# Patient Record
Sex: FEMALE | Race: WHITE | NOT HISPANIC OR LATINO | URBAN - NONMETROPOLITAN AREA
[De-identification: names, ages, dates, MRNs, and addresses within clinical notes are randomized per-mention and may not be internally consistent; named-entity substitution may affect disease eponyms.]

---

## 2019-07-08 ENCOUNTER — IMPORTED ENCOUNTER (OUTPATIENT)
Dept: URBAN - NONMETROPOLITAN AREA CLINIC 1 | Facility: CLINIC | Age: 69
End: 2019-07-08

## 2019-07-08 PROBLEM — D31.31: Noted: 2019-07-08

## 2019-07-08 PROBLEM — E11.9: Noted: 2019-07-08

## 2019-07-08 PROBLEM — H52.4: Noted: 2019-07-08

## 2019-07-08 PROBLEM — H10.32: Noted: 2019-07-08

## 2019-07-08 PROBLEM — D31.32: Noted: 2019-07-08

## 2019-07-08 PROBLEM — H25.13: Noted: 2019-07-08

## 2019-07-08 PROCEDURE — 99213 OFFICE O/P EST LOW 20 MIN: CPT

## 2019-07-08 NOTE — PATIENT DISCUSSION
Acute Conjunctivitis OSDiscussed findings in detail with patient. (-) foreign body (-) abrasion. Start Zylet TID OS sample given today use until sample is empty. Continue to monitor. RTC N/A for complete eye exam Presbyopia OUDiscussed refractive status in detail with patientContinue to monitor. NIDDM sans Retinopathy Discussed diagnosis with patientDiscussed the risk of diabetic damage of the retina with potential vision loss and the importance of routine follow-upEmphasized strict blood sugar controlContinue to Todd OUDiscussed diagnosis with patientReviewed symptoms related to cataract progressionDiscussed various treatment options with patientPatient defers treatment at this timeContinue to monitorChor Nevus OUDiscussed diagnosis in detail with patientContinue to monitor; Dr's Notes: PCP - Anika ADAM at [facility] 6/22/18DFE  6/16/17OCTOptos  12/12/16

## 2019-07-15 ENCOUNTER — IMPORTED ENCOUNTER (OUTPATIENT)
Dept: URBAN - NONMETROPOLITAN AREA CLINIC 1 | Facility: CLINIC | Age: 69
End: 2019-07-15

## 2019-07-15 PROBLEM — H52.4: Noted: 2019-07-15

## 2019-07-15 PROBLEM — E11.9: Noted: 2019-10-02

## 2019-07-15 PROBLEM — D31.32: Noted: 2019-07-15

## 2019-07-15 PROBLEM — D31.31: Noted: 2019-07-15

## 2019-07-15 PROBLEM — H52.4: Noted: 2019-10-02

## 2019-07-15 PROBLEM — H25.13: Noted: 2019-10-02

## 2019-07-15 PROBLEM — E11.9: Noted: 2019-07-15

## 2019-07-15 PROBLEM — H25.13: Noted: 2019-07-15

## 2019-07-15 PROCEDURE — 92014 COMPRE OPH EXAM EST PT 1/>: CPT

## 2019-07-15 PROCEDURE — 92015 DETERMINE REFRACTIVE STATE: CPT

## 2019-07-15 NOTE — PATIENT DISCUSSION
Presbyopia OUDiscussed refractive status in detail with patientMR done today but do not recommend updating due to cataract progression. Continue to monitor. NIDDM sans Retinopathy Discussed diagnosis with patientDiscussed the risk of diabetic damage of the retina with potential vision loss and the importance of routine follow-upEmphasized strict blood sugar controlContinue to [place] OUDiscussed diagnosis with patientReviewed symptoms related to cataract progressionDiscussed various treatment options with patientRecommend refer to Dr. Stephen Gillette for evaluation. Patient agrees with plan will scheduleChor Nevus OUDiscussed diagnosis in detail with patientContinue to monitor; Dr's Notes: PCP - Kanika ADAM at [facility] 7/15/19DFE  7/15/19Optos  12/12/16

## 2019-10-02 ENCOUNTER — IMPORTED ENCOUNTER (OUTPATIENT)
Dept: URBAN - NONMETROPOLITAN AREA CLINIC 1 | Facility: CLINIC | Age: 69
End: 2019-10-02

## 2019-10-02 PROCEDURE — 99214 OFFICE O/P EST MOD 30 MIN: CPT

## 2019-10-02 NOTE — PATIENT DISCUSSION
NIDDM sans Retinopathy Discussed diagnosis with patientDiscussed the risk of diabetic damage of the retina with potential vision loss and the importance of routine follow-upEmphasized strict blood sugar controlContinue to monitorChor Nevus OUDiscussed diagnosis in detail with patientContinue to monitorCataract(s)-Visually significant cataract OU .-Cataract(s) causing symptomatic impairment of visual function not correctable with a tolerable change in glasses or contact lenses lighting or non-operative means resulting in specific activity limitations and/or participation restrictions including but not limited to reading viewing television driving or meeting vocational or recreational needs. -Expectation is clearer vision and functional improvement in symptoms as well as reduced glare disability after cataract removal.-Order IOLMaster and OPD today. -Recommend Standard/Traditional (Toric if [employer] approves)  based on today's OPD testing and lifestyle questionnaire.-All questions were answered regarding surgery including pre and post-op medications appointments activity restrictions and anesthetic usage.-The risks benefits and alternatives and special risk factors for the patient were discussed in detail including but not limited to: bleeding infection retinal detachment vitreous loss problems with the implant and possible need for additional surgery.-Although rare the possibility of complete vision loss was discussed.-The possible need for glasses post-operatively was discussed.-Order medical clearance exam based on history of diabetes-Patient elects to proceed with cataract surgery OD . Will schedule at patient's convenience and re-evaluate OS  in the future. **Doctor Recommendations**- Discussed Standard Implants OU - Discussed Traditional Sx OU - Discussed Toric implants patient to check into [employer]; 's Notes: PCP - Oxana ADAM at [facility] 7/15/19DFE  7/15/19Optos  12/12/16

## 2019-11-07 ENCOUNTER — IMPORTED ENCOUNTER (OUTPATIENT)
Dept: URBAN - NONMETROPOLITAN AREA CLINIC 1 | Facility: CLINIC | Age: 69
End: 2019-11-07

## 2019-11-07 PROBLEM — Z01.818: Noted: 2019-11-07

## 2019-11-07 PROBLEM — E11.9: Noted: 2019-11-07

## 2019-11-07 PROBLEM — M19.90: Noted: 2019-11-07

## 2019-11-07 PROBLEM — F41.1: Noted: 2019-11-07

## 2019-11-07 PROBLEM — I10: Noted: 2019-11-07

## 2019-11-07 PROBLEM — K76.0: Noted: 2019-11-07

## 2019-11-07 PROBLEM — E78.5: Noted: 2019-11-07

## 2019-11-07 PROCEDURE — 99214 OFFICE O/P EST MOD 30 MIN: CPT

## 2019-11-07 NOTE — PATIENT DISCUSSION
Medical Clearance-Medical clearance done today. -No outstanding concerns that would preclude surgery.-Patient is cleared to proceed with scheduled surgery.; Dr's Notes: PCP - Christian ADAM at [facility] 7/15/19DFE  7/15/19Optos  12/12/16

## 2019-11-13 ENCOUNTER — IMPORTED ENCOUNTER (OUTPATIENT)
Dept: URBAN - NONMETROPOLITAN AREA CLINIC 1 | Facility: CLINIC | Age: 69
End: 2019-11-13

## 2019-11-13 PROBLEM — Z98.41: Noted: 2019-11-13

## 2019-11-13 PROCEDURE — 99024 POSTOP FOLLOW-UP VISIT: CPT

## 2019-11-13 NOTE — PATIENT DISCUSSION
1 Day POV CE OD Standard 11/12/19-  The pt has undergone successful cataract extraction with Intraocular lens implantation in the right eye.-  PO examination is normal and visual acuity has improved. -  The pt has been instructed to call the office immediately if there is increased redness pain or vision loss. -  Instructed patient not to rub the eye and don’t go swimming. -  Pt should return in 1 week for follow up.  -  Ocular meds plan discussed and patient received printed take home instructions.; 's Notes: PCP - Christian ADAM at [facility] 7/15/19DFE  7/15/19Optos  12/12/16

## 2019-11-19 ENCOUNTER — IMPORTED ENCOUNTER (OUTPATIENT)
Dept: URBAN - NONMETROPOLITAN AREA CLINIC 1 | Facility: CLINIC | Age: 69
End: 2019-11-19

## 2019-11-19 PROBLEM — H25.812: Noted: 2019-11-19

## 2019-11-19 PROBLEM — Z98.41: Noted: 2019-11-19

## 2019-11-19 PROCEDURE — 99213 OFFICE O/P EST LOW 20 MIN: CPT

## 2019-11-19 PROCEDURE — 99024 POSTOP FOLLOW-UP VISIT: CPT

## 2019-11-19 NOTE — PATIENT DISCUSSION
Cataract(s)-Visually significant cataract OS . -Cataract(s) causing symptomatic impairment of visual function not correctable with a tolerable change in glasses or contact lenses lighting or non-operative means resulting in specific activity limitations and/or participation restrictions including but not limited to reading viewing television driving or meeting vocational or recreational needs. -Expectation is clearer vision and functional improvement in symptoms as well as reduced glare disability after cataract removal.-Recommend Standard IOL  based on previous OPD testing and lifestyle questionnaire.-All questions were answered regarding surgery including pre and post-op medications appointments activity restrictions and anesthetic usage.-The risks benefits and alternatives and special risk factors for the patient were discussed in detail including but not limited to: bleeding infection retinal detachment vitreous loss problems with the implant and possible need for additional surgery.-Although rare the possibility of complete vision loss was discussed.-The need for glasses post-operatively was discussed.-Patient elects to proceed with cataract surgery OS . Will schedule at patient's convenience. s/p PCIOL-Pt doing well at 1 week s/p PCIOL. -Continue post-op gtts according to instruction sheet.-Okay to resume usual activites and d/c eye shield.; Dr's Notes: PCP - Alejandro ADAM at [facility] 7/15/19DFE  7/15/19Optos  12/12/16

## 2019-11-27 ENCOUNTER — IMPORTED ENCOUNTER (OUTPATIENT)
Dept: URBAN - NONMETROPOLITAN AREA CLINIC 1 | Facility: CLINIC | Age: 69
End: 2019-11-27

## 2019-11-27 PROBLEM — Z98.41: Noted: 2019-11-19

## 2019-11-27 PROBLEM — Z98.42: Noted: 2019-11-27

## 2019-11-27 PROCEDURE — 99024 POSTOP FOLLOW-UP VISIT: CPT

## 2019-11-27 NOTE — PATIENT DISCUSSION
1 Day POV CE OS 11/26/19 CE OD 11/12/19 Standard IOL OU -  The pt has undergone successful cataract extraction with Intraocular lens implantation in both eyes now-  PO examination is normal and visual acuity has improved. -  Instructed patient not to rub the eye and don't go swimming. -  Pt should return in 1 week for follow up.  -  Ocular meds plan discussed and patient received printed take home instructions.; Dr's Notes: PCP - Espinoza ADAM at [facility] 7/15/19DFE  7/15/19Optos  12/12/16

## 2019-12-02 ENCOUNTER — IMPORTED ENCOUNTER (OUTPATIENT)
Dept: URBAN - NONMETROPOLITAN AREA CLINIC 1 | Facility: CLINIC | Age: 69
End: 2019-12-02

## 2019-12-02 PROCEDURE — 99024 POSTOP FOLLOW-UP VISIT: CPT

## 2019-12-02 NOTE — PATIENT DISCUSSION
1 week POV CE OS 11/26/19 CE OD 11/12/19 Standard IOL OU - Discussed diagnosis in detail with patient. - Patient is doing well and stable. - Continue all post op drops as directed. - Continue to monitor.- RTC in 3 weeks for POV with STALIN Gutierrez's Notes: PCP - Fabian ADAM at [facility] 7/15/19DFE  7/15/19Optos  12/12/16

## 2020-01-10 ENCOUNTER — IMPORTED ENCOUNTER (OUTPATIENT)
Dept: URBAN - NONMETROPOLITAN AREA CLINIC 1 | Facility: CLINIC | Age: 70
End: 2020-01-10

## 2020-01-10 PROCEDURE — 92015 DETERMINE REFRACTIVE STATE: CPT

## 2020-01-10 PROCEDURE — 99024 POSTOP FOLLOW-UP VISIT: CPT

## 2020-01-10 NOTE — PATIENT DISCUSSION
6 weekw POV CE OS 11/26/19 CE OD 11/12/19 Standard IOL OU - Discussed diagnosis in detail with patient. - Patient is doing well and stable. - MR done today; new glasses Rx given today. - Continue to monitor.- RTC in 3 months for follow up with DFE; Dr's Notes: PCP - Gunner ADAM at [facility] 1/10/20DFE  7/15/19Optos  12/12/16

## 2020-05-22 ENCOUNTER — IMPORTED ENCOUNTER (OUTPATIENT)
Dept: URBAN - NONMETROPOLITAN AREA CLINIC 1 | Facility: CLINIC | Age: 70
End: 2020-05-22

## 2020-05-22 PROBLEM — D31.32: Noted: 2020-05-22

## 2020-05-22 PROBLEM — Z96.1: Noted: 2020-05-22

## 2020-05-22 PROBLEM — D31.31: Noted: 2020-05-22

## 2020-05-22 PROBLEM — E11.9: Noted: 2020-05-22

## 2020-05-22 PROCEDURE — 99213 OFFICE O/P EST LOW 20 MIN: CPT

## 2020-05-22 NOTE — PATIENT DISCUSSION
P/C IOL OUDiscussed diagnosis in detail with patient. Both intraocular implants in place and stable. Continue to monitor. NIDDM sans Retinopathy OUDiscussed diagnosis with patient. Discussed the risk of diabetic damage of the retina with potential vision loss and the importance of routine follow-up. Stressed importance of blood sugar control. Continue to monitor. Chor nevus OU:Discussed diagnosis in detail with patientAppears stable on dilated exam today. Continue to monitor for changes; Dr's Notes: PCP - Kelli ADAM at [facility] 1/10/20DFE  5/22/20Optos  12/12/16

## 2021-05-28 ENCOUNTER — IMPORTED ENCOUNTER (OUTPATIENT)
Dept: URBAN - NONMETROPOLITAN AREA CLINIC 1 | Facility: CLINIC | Age: 71
End: 2021-05-28

## 2021-05-28 PROBLEM — D31.31: Noted: 2021-05-28

## 2021-05-28 PROBLEM — E11.9: Noted: 2021-05-28

## 2021-05-28 PROBLEM — H52.4: Noted: 2021-05-28

## 2021-05-28 PROBLEM — D31.32: Noted: 2021-05-28

## 2021-05-28 PROBLEM — Z96.1: Noted: 2021-05-28

## 2021-05-28 PROCEDURE — 92014 COMPRE OPH EXAM EST PT 1/>: CPT

## 2021-05-28 PROCEDURE — 92015 DETERMINE REFRACTIVE STATE: CPT

## 2021-05-28 NOTE — PATIENT DISCUSSION
P/C IOL OUDiscussed diagnosis in detail with patient. Both intraocular implants in place and stable. Continue to monitor. NIDDM sans Retinopathy OUDiscussed diagnosis with patient. Discussed the risk of diabetic damage of the retina with potential vision loss and the importance of routine follow-up. Stressed importance of blood sugar control. Continue to monitor. Chor nevus OU:Discussed diagnosis in detail with patientAppears stable on dilated exam today. Continue to monitor for changes; Dr's Notes: PCP - Fe ADAM at [facility] 1/10/20DFE  5/22/20Optos  12/12/16

## 2021-12-10 ENCOUNTER — IMPORTED ENCOUNTER (OUTPATIENT)
Dept: URBAN - NONMETROPOLITAN AREA CLINIC 1 | Facility: CLINIC | Age: 71
End: 2021-12-10

## 2021-12-10 PROBLEM — Z96.1: Noted: 2021-12-10

## 2021-12-10 PROBLEM — E11.9: Noted: 2021-12-10

## 2021-12-10 PROBLEM — D31.32: Noted: 2021-12-10

## 2021-12-10 PROBLEM — H10.423: Noted: 2021-12-10

## 2021-12-10 PROBLEM — D31.31: Noted: 2021-12-10

## 2021-12-10 PROCEDURE — 99213 OFFICE O/P EST LOW 20 MIN: CPT

## 2021-12-10 NOTE — PATIENT DISCUSSION
Allergies OUDiscussed diagnosis in detail with patient. Signs/symptoms discussed with patient. Papillae/injection noted OS>OD. D/C Clear Eyes Start Pataday QD OU X 2 weeks then PRN. Patient to call if no improvement. Continue to monitor. NOTES FROM PREVIOUS:P/C IOL OUDiscussed diagnosis in detail with patient. Both intraocular implants in place and stable. Continue to monitor. NIDDM sans Retinopathy OUDiscussed diagnosis with patient. Discussed the risk of diabetic damage of the retina with potential vision loss and the importance of routine follow-up. Stressed importance of blood sugar control. Continue to monitor. Chor nevus OU:Discussed diagnosis in detail with patientAppears stable on dilated exam today. Continue to monitor for changes; Dr's Notes: PCP - Jarrett ADAM at [facility] 1/10/20DFE  5/22/20Optos  12/12/16

## 2022-04-10 ASSESSMENT — VISUAL ACUITY
OS_SC: 20/20
OS_CC: 20/40-
OD_SC: 20/25
OS_SC: 20/25
OD_SC: 20/25-2
OD_GLARE: 20/300
OS_CC: CF4'
OD_SC: 20/20
OS_PH: 20/29
OD_CC: 20/40-1
OS_SC: 20/25+
OD_CC: 20/25
OS_CC: J1+
OS_SC: 20/30+
OS_GLARE: 20/100+
OS_CC: 20/40-
OS_SC: 20/25-
OD_SC: 20/25-
OD_PAM: 20/20-
OS_GLARE: 20/200
OS_GLARE: 20/200
OD_GLARE: 20/300
OD_CC: J1
OD_CC: 20/29
OD_PH: 20/22-
OS_CC: 20/300
OS_CC: 20/30
OD_GLARE: 20/100+
OS_AM: 20/20
OS_PH: 20/40+
OS_AM: 20/25
OD_SC: 20/20-2
OS_SC: 20/20-2
OD_CC: 20/30
OS_CC: 20/50
OD_SC: 20/20
OS_PH: 20/29+

## 2022-04-10 ASSESSMENT — TONOMETRY
OS_IOP_MMHG: 12
OD_IOP_MMHG: 18
OD_IOP_MMHG: 12
OS_IOP_MMHG: 14
OD_IOP_MMHG: 12
OS_IOP_MMHG: 12
OD_IOP_MMHG: 11
OD_IOP_MMHG: 13
OS_IOP_MMHG: 14
OS_IOP_MMHG: 15
OD_IOP_MMHG: 15
OD_IOP_MMHG: 12
OS_IOP_MMHG: 16
OS_IOP_MMHG: 12
OS_IOP_MMHG: 14
OD_IOP_MMHG: 15
OD_IOP_MMHG: 14
OD_IOP_MMHG: 13
OS_IOP_MMHG: 12
OS_IOP_MMHG: 13

## 2022-09-09 ENCOUNTER — ESTABLISHED PATIENT (OUTPATIENT)
Dept: URBAN - NONMETROPOLITAN AREA CLINIC 1 | Facility: CLINIC | Age: 72
End: 2022-09-09

## 2022-09-09 DIAGNOSIS — H52.4: ICD-10-CM

## 2022-09-09 PROCEDURE — 92015 DETERMINE REFRACTIVE STATE: CPT

## 2022-09-09 PROCEDURE — 92014 COMPRE OPH EXAM EST PT 1/>: CPT

## 2022-09-09 ASSESSMENT — VISUAL ACUITY
OS_CC: 20/25-
OD_CC: 20/22

## 2022-09-09 ASSESSMENT — TONOMETRY
OD_IOP_MMHG: 12
OS_IOP_MMHG: 12

## 2023-09-18 ENCOUNTER — ESTABLISHED PATIENT (OUTPATIENT)
Dept: URBAN - NONMETROPOLITAN AREA CLINIC 1 | Facility: CLINIC | Age: 73
End: 2023-09-18

## 2023-09-18 DIAGNOSIS — D31.32: ICD-10-CM

## 2023-09-18 DIAGNOSIS — D31.31: ICD-10-CM

## 2023-09-18 DIAGNOSIS — H52.4: ICD-10-CM

## 2023-09-18 DIAGNOSIS — H10.423: ICD-10-CM

## 2023-09-18 DIAGNOSIS — E11.9: ICD-10-CM

## 2023-09-18 PROCEDURE — 92015 DETERMINE REFRACTIVE STATE: CPT

## 2023-09-18 PROCEDURE — 92014 COMPRE OPH EXAM EST PT 1/>: CPT

## 2023-09-18 ASSESSMENT — VISUAL ACUITY
OS_SC: 20/40-1
OD_SC: 20/29-2
OU_SC: 20/25

## 2023-09-18 ASSESSMENT — TONOMETRY
OS_IOP_MMHG: 14
OD_IOP_MMHG: 14

## 2025-02-03 ENCOUNTER — COMPREHENSIVE EXAM (OUTPATIENT)
Age: 75
End: 2025-02-03

## 2025-02-03 DIAGNOSIS — H52.4: ICD-10-CM

## 2025-02-03 PROCEDURE — 92015 DETERMINE REFRACTIVE STATE: CPT

## 2025-02-03 PROCEDURE — 92014 COMPRE OPH EXAM EST PT 1/>: CPT
